# Patient Record
Sex: FEMALE
[De-identification: names, ages, dates, MRNs, and addresses within clinical notes are randomized per-mention and may not be internally consistent; named-entity substitution may affect disease eponyms.]

---

## 2022-12-23 PROBLEM — Z00.00 ENCOUNTER FOR PREVENTIVE HEALTH EXAMINATION: Status: ACTIVE | Noted: 2022-12-23

## 2023-01-05 ENCOUNTER — APPOINTMENT (OUTPATIENT)
Dept: RHEUMATOLOGY | Facility: CLINIC | Age: 78
End: 2023-01-05
Payer: MEDICARE

## 2023-01-05 ENCOUNTER — LABORATORY RESULT (OUTPATIENT)
Age: 78
End: 2023-01-05

## 2023-01-05 VITALS
TEMPERATURE: 98.4 F | OXYGEN SATURATION: 97 % | DIASTOLIC BLOOD PRESSURE: 77 MMHG | SYSTOLIC BLOOD PRESSURE: 129 MMHG | HEIGHT: 62 IN | BODY MASS INDEX: 22.54 KG/M2 | HEART RATE: 81 BPM | WEIGHT: 122.5 LBS

## 2023-01-05 DIAGNOSIS — Z82.49 FAMILY HISTORY OF ISCHEMIC HEART DISEASE AND OTHER DISEASES OF THE CIRCULATORY SYSTEM: ICD-10-CM

## 2023-01-05 DIAGNOSIS — L71.9 ROSACEA, UNSPECIFIED: ICD-10-CM

## 2023-01-05 DIAGNOSIS — Z78.9 OTHER SPECIFIED HEALTH STATUS: ICD-10-CM

## 2023-01-05 DIAGNOSIS — Z82.3 FAMILY HISTORY OF STROKE: ICD-10-CM

## 2023-01-05 DIAGNOSIS — Z87.39 PERSONAL HISTORY OF OTHER DISEASES OF THE MUSCULOSKELETAL SYSTEM AND CONNECTIVE TISSUE: ICD-10-CM

## 2023-01-05 DIAGNOSIS — Z80.8 FAMILY HISTORY OF MALIGNANT NEOPLASM OF OTHER ORGANS OR SYSTEMS: ICD-10-CM

## 2023-01-05 DIAGNOSIS — L65.9 NONSCARRING HAIR LOSS, UNSPECIFIED: ICD-10-CM

## 2023-01-05 DIAGNOSIS — R53.83 OTHER FATIGUE: ICD-10-CM

## 2023-01-05 DIAGNOSIS — Z82.61 FAMILY HISTORY OF ARTHRITIS: ICD-10-CM

## 2023-01-05 DIAGNOSIS — Z81.8 FAMILY HISTORY OF OTHER MENTAL AND BEHAVIORAL DISORDERS: ICD-10-CM

## 2023-01-05 PROCEDURE — 36415 COLL VENOUS BLD VENIPUNCTURE: CPT

## 2023-01-05 PROCEDURE — 99204 OFFICE O/P NEW MOD 45 MIN: CPT | Mod: 25

## 2023-01-06 PROBLEM — Z82.3 FAMILY HISTORY OF CEREBROVASCULAR ACCIDENT (CVA): Status: ACTIVE | Noted: 2023-01-05

## 2023-01-06 PROBLEM — Z82.49 FAMILY HISTORY OF HYPERTENSION: Status: ACTIVE | Noted: 2023-01-05

## 2023-01-06 PROBLEM — Z82.49 FAMILY HISTORY OF MYOCARDIAL INFARCTION: Status: ACTIVE | Noted: 2023-01-05

## 2023-01-06 PROBLEM — Z81.8 FAMILY HISTORY OF DEMENTIA: Status: ACTIVE | Noted: 2023-01-05

## 2023-01-06 PROBLEM — Z78.9 CAFFEINE USE: Status: ACTIVE | Noted: 2023-01-05

## 2023-01-06 PROBLEM — Z80.8 FAMILY HISTORY OF MALIGNANT NEOPLASM OF LIP: Status: ACTIVE | Noted: 2023-01-05

## 2023-01-06 PROBLEM — Z82.61 FAMILY HISTORY OF ARTHRITIS: Status: ACTIVE | Noted: 2023-01-05

## 2023-01-06 PROBLEM — Z87.39 HISTORY OF FIBROMYALGIA: Status: RESOLVED | Noted: 2023-01-05 | Resolved: 2023-01-06

## 2023-01-06 LAB
ALBUMIN SERPL ELPH-MCNC: 4.5 G/DL
ALP BLD-CCNC: 91 U/L
ALT SERPL-CCNC: 14 U/L
ANION GAP SERPL CALC-SCNC: 14 MMOL/L
AST SERPL-CCNC: 18 U/L
BASOPHILS # BLD AUTO: 0.1 K/UL
BASOPHILS NFR BLD AUTO: 1.3 %
BILIRUB SERPL-MCNC: 0.4 MG/DL
BUN SERPL-MCNC: 17 MG/DL
C3 SERPL-MCNC: 102 MG/DL
C4 SERPL-MCNC: 17 MG/DL
CALCIUM SERPL-MCNC: 9.9 MG/DL
CHLORIDE SERPL-SCNC: 105 MMOL/L
CO2 SERPL-SCNC: 21 MMOL/L
CREAT SERPL-MCNC: 0.67 MG/DL
EGFR: 91 ML/MIN/1.73M2
ENA RNP AB SER IA-ACNC: <0.2 AL
ENA SM AB SER IA-ACNC: <0.2 AL
ENA SS-A AB SER IA-ACNC: <0.2 AL
ENA SS-B AB SER IA-ACNC: <0.2 AL
EOSINOPHIL # BLD AUTO: 0.12 K/UL
EOSINOPHIL NFR BLD AUTO: 1.6 %
GLUCOSE SERPL-MCNC: 86 MG/DL
HCT VFR BLD CALC: 41 %
HGB BLD-MCNC: 13.4 G/DL
IMM GRANULOCYTES NFR BLD AUTO: 0.3 %
LYMPHOCYTES # BLD AUTO: 2.66 K/UL
LYMPHOCYTES NFR BLD AUTO: 35.7 %
MAN DIFF?: NORMAL
MCHC RBC-ENTMCNC: 32.5 PG
MCHC RBC-ENTMCNC: 32.7 GM/DL
MCV RBC AUTO: 99.5 FL
MONOCYTES # BLD AUTO: 0.68 K/UL
MONOCYTES NFR BLD AUTO: 9.1 %
NEUTROPHILS # BLD AUTO: 3.88 K/UL
NEUTROPHILS NFR BLD AUTO: 52 %
PLATELET # BLD AUTO: 290 K/UL
POTASSIUM SERPL-SCNC: 4.5 MMOL/L
PROT SERPL-MCNC: 7 G/DL
RBC # BLD: 4.12 M/UL
RBC # FLD: 12.7 %
RIBOSOMAL P AB SER IA-ACNC: <0.2 AL
SODIUM SERPL-SCNC: 139 MMOL/L
TSH SERPL-ACNC: 1.91 UIU/ML
WBC # FLD AUTO: 7.46 K/UL

## 2023-01-06 RX ORDER — METRONIDAZOLE 10 MG/G
GEL TOPICAL
Refills: 0 | Status: ACTIVE | COMMUNITY

## 2023-01-06 NOTE — REVIEW OF SYSTEMS
[Feeling Poorly] : feeling poorly [Arthralgias] : arthralgias [Negative] : Heme/Lymph [Recent Weight Loss (___ Lbs)] : recent [unfilled] ~Ulb weight loss

## 2023-01-06 NOTE — PHYSICAL EXAM
[General Appearance - Alert] : alert [General Appearance - In No Acute Distress] : in no acute distress [Sclera] : the sclera and conjunctiva were normal [Examination Of The Oral Cavity] : the lips and gums were normal [Oropharynx] : the oropharynx was normal [Edema] : there was no peripheral edema [Abnormal Walk] : normal gait [Musculoskeletal - Swelling] : no joint swelling seen [Motor Tone] : muscle strength and tone were normal [] : no rash [Oriented To Time, Place, And Person] : oriented to person, place, and time [Impaired Insight] : insight and judgment were intact [Affect] : the affect was normal [FreeTextEntry1] : No active synovitis of the upper and lower extremities bilaterally.  Patient with decreased range of motion of bilateral hips, left more than right with decreased muscle tone of the bilateral quadriceps.

## 2023-01-06 NOTE — HISTORY OF PRESENT ILLNESS
[FreeTextEntry1] : 77-year-old woman referred for rheumatology evaluation\par \par Patient was recently seen by primary care provider\par Reported fatigue, feeling "horrible"\par Had blood tests completed notable for LUDY positive and dsdna positive\par \par Feels pain in the  knees and wrists\par Looks as though lost weight\par Weight baseline about one year ago was 138, friend thought lost weight\par Does not feel well overall\par \par Pleated mammogram two years ago, will schedule follow-up\par Bone density for end of next month\par Covid boosters completed\par Had stool testing x 2 in past couple of years which were normal, no colonoscopy \par \par Recent labs\par CRP <3\par ESR 14\par RF <10\par Had recent shingles vaccine\par \par Takes metrogel for rosacea\par No other medications\par \par Walks between 2-3 miles per day\par History of left hip OA\par Now knees bothering her  as well\par Achilles tendinitis which worsens with walking too much\par \par No PT at present, has prescription from Dr. Yougn\par Was seen by orthopedist in [place] in March 2021, had PT at that time, no surgical recommendation at that time\par Feels shortness of breath\par \par

## 2023-01-06 NOTE — DATA REVIEWED
[FreeTextEntry1] : Labs 11/22.2022\par CRP <3\par ESR 14\par RF <10\par \par xray hip 2018\par severe oa right hip\par moderate to severe on the left\par \par saw cardiologist in 2021\par Had holter monitor\par

## 2023-01-06 NOTE — ASSESSMENT
[FreeTextEntry1] : 77-year-old woman referred for rheumatology evaluation.  Patient recently evaluated by primary care provider as recently has not been feeling well, feels fatigued and arthralgias.  Recent blood test noted positive LUDY as well as anti double-stranded DNA and inflammatory markers in the normal range.  Patient with known osteoarthritis of the bilateral hips, previous x-rays notable for severe osteoarthritis of the right hip and moderate to severe OA of the left hip. Patient has referral for physical therapy, will make appointment, previous evaluation by orthopedist, no surgical intervention at that time.  Osteoarthritis of the bilateral hips likely contributing to increased knee paii as well. At this time, patient does not meet criteria for an underlying connective tissue disease, however given positive LUDY and antidouble-stranded DNA will obtain further testing today.  Will obtain CBC, CMP, TSH, SPEP, complement C3 and C4, antiribosomal P antibody, ALFONSO antibody, as well as Sjogren's antibodies.  \par ,

## 2023-01-19 ENCOUNTER — TRANSCRIPTION ENCOUNTER (OUTPATIENT)
Age: 78
End: 2023-01-19

## 2023-01-24 ENCOUNTER — TRANSCRIPTION ENCOUNTER (OUTPATIENT)
Age: 78
End: 2023-01-24

## 2023-01-25 ENCOUNTER — APPOINTMENT (OUTPATIENT)
Dept: RHEUMATOLOGY | Facility: CLINIC | Age: 78
End: 2023-01-25
Payer: MEDICARE

## 2023-01-25 VITALS
WEIGHT: 123 LBS | HEART RATE: 90 BPM | OXYGEN SATURATION: 99 % | TEMPERATURE: 97.9 F | DIASTOLIC BLOOD PRESSURE: 88 MMHG | HEIGHT: 62 IN | BODY MASS INDEX: 22.63 KG/M2 | SYSTOLIC BLOOD PRESSURE: 143 MMHG

## 2023-01-25 DIAGNOSIS — R76.8 OTHER SPECIFIED ABNORMAL IMMUNOLOGICAL FINDINGS IN SERUM: ICD-10-CM

## 2023-01-25 DIAGNOSIS — M25.50 PAIN IN UNSPECIFIED JOINT: ICD-10-CM

## 2023-01-25 LAB
ALBUMIN MFR SERPL ELPH: 61.7 %
ALBUMIN SERPL-MCNC: 4.4 G/DL
ALBUMIN/GLOB SERPL: 1.6 RATIO
ALPHA1 GLOB MFR SERPL ELPH: 4 %
ALPHA1 GLOB SERPL ELPH-MCNC: 0.3 G/DL
ALPHA2 GLOB MFR SERPL ELPH: 9.8 %
ALPHA2 GLOB SERPL ELPH-MCNC: 0.7 G/DL
ANA PAT FLD IF-IMP: ABNORMAL
ANA PATTERN: ABNORMAL
ANA SER IF-ACNC: ABNORMAL
ANA TITER: ABNORMAL
B-GLOBULIN MFR SERPL ELPH: 10.5 %
B-GLOBULIN SERPL ELPH-MCNC: 0.7 G/DL
CCP AB SER IA-ACNC: <8 UNITS
DSDNA AB SER-ACNC: 66 IU/ML
GAMMA GLOB FLD ELPH-MCNC: 1 G/DL
GAMMA GLOB MFR SERPL ELPH: 14 %
INTERPRETATION SERPL IEP-IMP: NORMAL
PROT SERPL-MCNC: 7.1 G/DL
PROT SERPL-MCNC: 7.1 G/DL
RF+CCP IGG SER-IMP: NEGATIVE

## 2023-01-25 PROCEDURE — 99214 OFFICE O/P EST MOD 30 MIN: CPT

## 2023-01-25 RX ORDER — PREDNISONE 10 MG/1
10 TABLET ORAL DAILY
Qty: 5 | Refills: 0 | Status: ACTIVE | COMMUNITY
Start: 2023-01-25 | End: 1900-01-01

## 2023-01-27 ENCOUNTER — TRANSCRIPTION ENCOUNTER (OUTPATIENT)
Age: 78
End: 2023-01-27

## 2023-01-30 ENCOUNTER — TRANSCRIPTION ENCOUNTER (OUTPATIENT)
Age: 78
End: 2023-01-30

## 2023-01-30 NOTE — PHYSICAL EXAM
[General Appearance - Alert] : alert [General Appearance - In No Acute Distress] : in no acute distress [General Appearance - Well-Appearing] : healthy appearing [Sclera] : the sclera and conjunctiva were normal [] : no respiratory distress [Respiration, Rhythm And Depth] : normal respiratory rhythm and effort [Exaggerated Use Of Accessory Muscles For Inspiration] : no accessory muscle use [Oriented To Time, Place, And Person] : oriented to person, place, and time [Impaired Insight] : insight and judgment were intact [Affect] : the affect was normal [FreeTextEntry1] : slow gait, no active synovitis.

## 2023-01-30 NOTE — ASSESSMENT
[FreeTextEntry1] : 77-year-old woman returns for  rheumatology follow up.  Patient with fatigue, arthralgias, and morning stiffness without ernesto synovitis of joints Recent blood test noted positive LUDY as well as anti double-stranded DNA with complements, ESR, and CRP in the normal range.  Patient with known osteoarthritis of the bilateral hips, previous x-rays notable for severe osteoarthritis of the right hip and moderate to severe OA of the left hip. Patient has referral for physical therapy, will make appointment, previous evaluation by orthopedist, no surgical intervention at that time.  Osteoarthritis of the bilateral hips likely contributing to increased knee pain as well.   At this time, patient does not meet criteria for an underlying connective tissue disease, however given positive LUDY and anti double-stranded DNA  and degree of morning stiffness, will try a short course of prednisone 10 mg qday for five days.  If benefit, will consider trial of plaquenil, discussed risks and benefit with patient, patient previously took chloroquine for an extended period for malaria prophylaxis without side effects.  Further management pending response to medication.

## 2023-01-30 NOTE — DATA REVIEWED
[FreeTextEntry1] : Labs 11/22.2022\par CRP <3\par ESR 14\par RF <10\par LUDY 1:160\par anti dsdna 75\par \par xray hip 2018\par severe oa right hip\par moderate to severe on the left\par \par xray ankle 8/30/2021\par No erosive or hypertrophic arthropathy noted.  No acute fracture noted.  Small bilateral posterior and plantar calcaneal spurs are identified.  Heterotopic ossiification of the Achilles tendon is identified on the left. \par saw cardiologist in 2021\par Had holter monitor\par

## 2023-01-30 NOTE — HISTORY OF PRESENT ILLNESS
[FreeTextEntry1] : 77-year-old woman referred for rheumatology evaluation\par \par Patient was recently seen by primary care provider\par Reported fatigue, feeling "horrible"\par Had blood tests completed notable for LUDY positive and dsdna positive\par \par Feels pain in the  knees and wrists\par Looks as though lost weight\par Weight baseline about one year ago was 138, friend thought lost weight\par Does not feel well overall\par \par Pleated mammogram two years ago, will schedule follow-up\par Bone density for end of next month\par Covid boosters completed\par Had stool testing x 2 in past couple of years which were normal, no colonoscopy \par \par Recent labs\par CRP <3\par ESR 14\par RF <10\par Had recent shingles vaccine\par \par Takes metrogel for rosacea\par No other medications\par \par Walks between 2-3 miles per day\par History of left hip OA\par Now knees bothering her  as well\par Achilles tendinitis which worsens with walking too much\par \par No PT at present, has prescription from Dr. Young\par Was seen by orthopedist in [place] in March 2021, had PT at that time, no surgical recommendation at that time\par Feels shortness of breath\par \par January 25, 2023\par Patient returns for follow up\par Feels fatigue and pain, feels exhausted all the time\par Pain in the hands and feet \par Feels stiff in the morning, last for hours, took two ibuprofen this morning\par Cannot sit for more than 20 minutes\par Symptoms worsening over  the past 1-2 years, but much worse over the past three months\par Friends tell her she looks different than before, lost about 15 pounds between September and now)\par Feels had to adjust how to walk, as reports left foot and ankle twist with each step\par Had bone density yesterday, will forward results to office\par Previously with Osteopenia, "one spot of Osteoporosis" in [id number]\par Father diagnosed with RA, treated with methotrexate for some times then was able to discontinue\par omega 3\par magnesium (recently ran out)\par vitamin d (very low 2 years go)\par sometimes women's mvi\par Not taking calcium, eats a lot of dairy\par collagen supplements not everyday\par metamucil \par Reports cymbalta in past when treated for fibromyalgia with unclear benefit\par (Note addended to reflect patient's additions/clarifications of history)

## 2023-01-30 NOTE — REVIEW OF SYSTEMS
[Feeling Poorly] : feeling poorly [Feeling Tired] : feeling tired [Arthralgias] : arthralgias [Joint Stiffness] : joint stiffness [Negative] : Heme/Lymph [Joint Swelling] : no joint swelling

## 2023-01-31 ENCOUNTER — NON-APPOINTMENT (OUTPATIENT)
Age: 78
End: 2023-01-31

## 2023-02-07 ENCOUNTER — NON-APPOINTMENT (OUTPATIENT)
Age: 78
End: 2023-02-07

## 2023-02-15 ENCOUNTER — TRANSCRIPTION ENCOUNTER (OUTPATIENT)
Age: 78
End: 2023-02-15

## 2023-02-16 ENCOUNTER — NON-APPOINTMENT (OUTPATIENT)
Age: 78
End: 2023-02-16

## 2023-02-21 ENCOUNTER — APPOINTMENT (OUTPATIENT)
Dept: RHEUMATOLOGY | Facility: CLINIC | Age: 78
End: 2023-02-21